# Patient Record
Sex: MALE | Race: WHITE | NOT HISPANIC OR LATINO | URBAN - METROPOLITAN AREA
[De-identification: names, ages, dates, MRNs, and addresses within clinical notes are randomized per-mention and may not be internally consistent; named-entity substitution may affect disease eponyms.]

---

## 2022-09-15 ENCOUNTER — OFFICE VISIT (OUTPATIENT)
Dept: INTERNAL MEDICINE CLINIC | Facility: CLINIC | Age: 32
End: 2022-09-15
Payer: COMMERCIAL

## 2022-09-15 VITALS
OXYGEN SATURATION: 98 % | HEART RATE: 80 BPM | SYSTOLIC BLOOD PRESSURE: 122 MMHG | TEMPERATURE: 98 F | RESPIRATION RATE: 12 BRPM | BODY MASS INDEX: 17.04 KG/M2 | WEIGHT: 119 LBS | DIASTOLIC BLOOD PRESSURE: 70 MMHG | HEIGHT: 70 IN

## 2022-09-15 DIAGNOSIS — S82.001A CLOSED NONDISPLACED FRACTURE OF RIGHT PATELLA, UNSPECIFIED FRACTURE MORPHOLOGY, INITIAL ENCOUNTER: ICD-10-CM

## 2022-09-15 DIAGNOSIS — M25.561 ACUTE PAIN OF RIGHT KNEE: Primary | ICD-10-CM

## 2022-09-15 DIAGNOSIS — F33.41 RECURRENT MAJOR DEPRESSIVE DISORDER, IN PARTIAL REMISSION (HCC): ICD-10-CM

## 2022-09-15 PROCEDURE — 99203 OFFICE O/P NEW LOW 30 MIN: CPT | Performed by: INTERNAL MEDICINE

## 2022-09-15 PROCEDURE — 3725F SCREEN DEPRESSION PERFORMED: CPT | Performed by: INTERNAL MEDICINE

## 2022-09-15 RX ORDER — METHYLPREDNISOLONE 4 MG/1
TABLET ORAL
Qty: 21 EACH | Refills: 0 | Status: SHIPPED | OUTPATIENT
Start: 2022-09-15
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
## 2022-09-29 ENCOUNTER — OFFICE VISIT (OUTPATIENT)
Dept: INTERNAL MEDICINE CLINIC | Facility: CLINIC | Age: 32
End: 2022-09-29
Payer: COMMERCIAL

## 2022-09-29 VITALS
DIASTOLIC BLOOD PRESSURE: 70 MMHG | WEIGHT: 126 LBS | HEART RATE: 74 BPM | TEMPERATURE: 97.8 F | SYSTOLIC BLOOD PRESSURE: 130 MMHG | HEIGHT: 70 IN | BODY MASS INDEX: 18.04 KG/M2

## 2022-09-29 DIAGNOSIS — J06.9 UPPER RESPIRATORY TRACT INFECTION, UNSPECIFIED TYPE: ICD-10-CM

## 2022-09-29 DIAGNOSIS — M25.561 ACUTE PAIN OF RIGHT KNEE: ICD-10-CM

## 2022-09-29 DIAGNOSIS — M25.561 ACUTE PAIN OF RIGHT KNEE: Primary | ICD-10-CM

## 2022-09-29 DIAGNOSIS — M25.561 CHRONIC PAIN OF RIGHT KNEE: ICD-10-CM

## 2022-09-29 DIAGNOSIS — G89.29 CHRONIC PAIN OF RIGHT KNEE: ICD-10-CM

## 2022-09-29 PROCEDURE — 99213 OFFICE O/P EST LOW 20 MIN: CPT | Performed by: INTERNAL MEDICINE
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
## 2022-11-07 ENCOUNTER — HOSPITAL ENCOUNTER (OUTPATIENT)
Dept: MRI IMAGING | Facility: HOSPITAL | Age: 32
Discharge: HOME | End: 2022-11-07

## 2022-11-07 DIAGNOSIS — M25.561 CHRONIC PAIN OF RIGHT KNEE: ICD-10-CM

## 2022-11-07 DIAGNOSIS — G89.29 CHRONIC PAIN OF RIGHT KNEE: ICD-10-CM

## 2022-11-15 ENCOUNTER — TELEPHONE (OUTPATIENT)
Dept: OBGYN CLINIC | Facility: HOSPITAL | Age: 32
End: 2022-11-15

## 2022-11-15 NOTE — TELEPHONE ENCOUNTER
Caller: Baljinder Rao    Doctor: Kate Zapata    Reason for call: Patient called to advise he has appointment w/Dr Kate Zapata 11/16. Stated MRI should be in my chart.  Found that patient has two MRN's [id number] which does have the MRI from 11/7 and [id number]      Call back#: [phone]

## 2022-11-16 ENCOUNTER — OFFICE VISIT (OUTPATIENT)
Dept: OBGYN CLINIC | Facility: CLINIC | Age: 32
End: 2022-11-16

## 2022-11-16 ENCOUNTER — APPOINTMENT (OUTPATIENT)
Dept: RADIOLOGY | Facility: CLINIC | Age: 32
End: 2022-11-16

## 2022-11-16 VITALS
WEIGHT: 119 LBS | DIASTOLIC BLOOD PRESSURE: 72 MMHG | SYSTOLIC BLOOD PRESSURE: 122 MMHG | BODY MASS INDEX: 17.04 KG/M2 | HEIGHT: 70 IN

## 2022-11-16 DIAGNOSIS — S83.242A OTHER TEAR OF MEDIAL MENISCUS OF LEFT KNEE AS CURRENT INJURY, INITIAL ENCOUNTER: Primary | ICD-10-CM

## 2022-11-16 DIAGNOSIS — M25.561 CHRONIC PAIN OF RIGHT KNEE: ICD-10-CM

## 2022-11-16 DIAGNOSIS — M25.561 RIGHT KNEE PAIN, UNSPECIFIED CHRONICITY: ICD-10-CM

## 2022-11-16 DIAGNOSIS — G89.29 CHRONIC PAIN OF RIGHT KNEE: ICD-10-CM

## 2022-11-16 NOTE — PROGRESS NOTES
Assessment:     1  Other tear of medial meniscus of left knee as current injury, initial encounter    2  Chronic pain of right knee        Plan:     Problem List Items Addressed This Visit        Musculoskeletal and Integument    Tear of medial meniscus of left knee, current - Primary    Relevant Orders    Ambulatory Referral to Physical Therapy    Durable Medical Equipment   Other Visit Diagnoses     Chronic pain of right knee        Relevant Orders    XR knee 4+ vw right injury    Ambulatory Referral to Physical Therapy    Durable Medical Equipment        Findings today are consistent with chronic right knee pain secondary to improper rehabilitation from patella fracture and medial meniscus tear  Imaging and prognosis was reviewed with the patient today  Discussed with patient his overall function of the knee is limited, most of his pain is likely from improper rehabilitation from patella fracture and not all from the medial mensicus tear  Referral to physical therapy was placed to work on the overall function of the knee  I explained to patient that surgical intervention for the meniscus tear will decrease his function further  I would like to improve patient's knee function and decrease the amount of pain he has before recommendation of surgery  I also like to review his treatment record from last year of his patella fracture  Patient will work on obtained records from [facility], PCP and physical therapy  Follow up in 4 weeks  All patient's questions were answered to his satisfaction  This note is created using dictation transcription  It may contain typographical errors, grammatical errors, improperly dictated words, background noise and other errors  Subjective:     Patient ID: Jovan Martinez is a 34 y o  male  Chief Complaint:  28-year-old male presents with a chief complaint of right knee pain  Patient is referred here by Dr Valerie Jean    The pain began 1 year(s) ago and is associated with an acute injury  Patient states that he suffered a fracture of his patella 11/4/2022 and has experienced knee pain ever since  The patient describes the pain as aching and throbbing and 8 out of 10 in intensity  It is constant in timing, and localizes the pain to the entire knee  The pain is worse with walking, going up and down stairs and standing and relieved with rest   He admits to mechanical symptoms such as locking and catching  He admits to instability of the knee  According to the patient after he sustained the fracture to his patella be was placed in a knee immobilizer for about 2 months  Patient also stated he start physical therapy while he was still in the knee immobilizer  Patient stated that his knee pain never resolved  He continued to ambulate with a limp  He now wears a short hinge knee brace when active  Information on patient's intake form was reviewed  Allergy:  No Known Allergies  Medications:  all current active meds have been reviewed  Past Medical History:  No past medical history on file  Past Surgical History:  Past Surgical History:   Procedure Laterality Date   • CHOLECYSTECTOMY  2015     Family History:  No family history on file  Social History:  Social History     Substance and Sexual Activity   Alcohol Use Not on file     Social History     Substance and Sexual Activity   Drug Use Not on file     Social History     Tobacco Use   Smoking Status Never   Smokeless Tobacco Never     Review of Systems   Constitutional: Negative for chills and fever  HENT: Negative for ear pain and sore throat  Eyes: Negative for pain and visual disturbance  Respiratory: Negative for cough and shortness of breath  Cardiovascular: Negative for chest pain and palpitations  Gastrointestinal: Negative for abdominal pain and vomiting  Genitourinary: Negative for dysuria and hematuria  Musculoskeletal: Positive for arthralgias (right knee) and gait problem (Antalgic)   Negative for back pain and joint swelling  Skin: Negative for color change and rash  Neurological: Negative for seizures and syncope  Psychiatric/Behavioral: Negative  All other systems reviewed and are negative  Objective:  BP Readings from Last 1 Encounters:   11/16/22 122/72      Wt Readings from Last 1 Encounters:   11/16/22 54 kg (119 lb)      BMI:   Estimated body mass index is 17 07 kg/m² as calculated from the following:    Height as of this encounter: 5' 10" (1 778 m)  Weight as of this encounter: 54 kg (119 lb)  BSA:   Estimated body surface area is 1 67 meters squared as calculated from the following:    Height as of this encounter: 5' 10" (1 778 m)  Weight as of this encounter: 54 kg (119 lb)  Physical Exam  Vitals and nursing note reviewed  Constitutional:       Appearance: Normal appearance  He is well-developed  HENT:      Head: Normocephalic and atraumatic  Right Ear: External ear normal       Left Ear: External ear normal    Eyes:      Extraocular Movements: Extraocular movements intact  Conjunctiva/sclera: Conjunctivae normal    Pulmonary:      Effort: Pulmonary effort is normal    Musculoskeletal:      Cervical back: Neck supple  Right knee: No effusion  Instability Tests: Medial Lorraine test positive  Lateral Lorraine test negative  Skin:     General: Skin is warm and dry  Neurological:      Mental Status: He is alert and oriented to person, place, and time  Psychiatric:         Mood and Affect: Mood normal          Behavior: Behavior normal        Right Knee Exam     Tenderness   The patient is experiencing tenderness in the lateral joint line and medial joint line (Anterior)      Range of Motion   Extension: 0   Flexion: 120 (Pain)     Tests   Lorraine:  Medial - positive Lateral - negative  Varus: negative Valgus: negative  Patellar apprehension: negative    Other   Erythema: absent  Scars: absent  Sensation: normal  Pulse: present  Swelling: none  Effusion: no effusion present            I have personally reviewed pertinent films in PACS and my interpretation is XR right knee: no acute osseous abnormalities  MRI right knee demonstrates tear of the medial meniscus       Scribe Attestation    I,:  Saravanan Palomino am acting as a scribe while in the presence of the attending physician :       I,:  Phoebe Pacheco MD personally performed the services described in this documentation    as scribed in my presence :

## 2024-09-14 ENCOUNTER — HOSPITAL ENCOUNTER (EMERGENCY)
Dept: HOSPITAL 99 - EMR | Age: 34
Discharge: HOME | End: 2024-09-14
Payer: COMMERCIAL

## 2024-09-14 VITALS — SYSTOLIC BLOOD PRESSURE: 99 MMHG | DIASTOLIC BLOOD PRESSURE: 73 MMHG | RESPIRATION RATE: 19 BRPM

## 2024-09-14 VITALS — RESPIRATION RATE: 16 BRPM

## 2024-09-14 VITALS — SYSTOLIC BLOOD PRESSURE: 103 MMHG | DIASTOLIC BLOOD PRESSURE: 52 MMHG | RESPIRATION RATE: 18 BRPM

## 2024-09-14 VITALS — DIASTOLIC BLOOD PRESSURE: 85 MMHG | RESPIRATION RATE: 15 BRPM | SYSTOLIC BLOOD PRESSURE: 111 MMHG

## 2024-09-14 VITALS — RESPIRATION RATE: 16 BRPM | DIASTOLIC BLOOD PRESSURE: 96 MMHG | SYSTOLIC BLOOD PRESSURE: 112 MMHG

## 2024-09-14 VITALS — RESPIRATION RATE: 23 BRPM

## 2024-09-14 VITALS — RESPIRATION RATE: 19 BRPM

## 2024-09-14 VITALS — RESPIRATION RATE: 17 BRPM

## 2024-09-14 VITALS — SYSTOLIC BLOOD PRESSURE: 128 MMHG | DIASTOLIC BLOOD PRESSURE: 81 MMHG | RESPIRATION RATE: 18 BRPM

## 2024-09-14 VITALS — RESPIRATION RATE: 13 BRPM

## 2024-09-14 VITALS — DIASTOLIC BLOOD PRESSURE: 79 MMHG | SYSTOLIC BLOOD PRESSURE: 108 MMHG | RESPIRATION RATE: 16 BRPM

## 2024-09-14 VITALS — DIASTOLIC BLOOD PRESSURE: 94 MMHG | SYSTOLIC BLOOD PRESSURE: 109 MMHG | RESPIRATION RATE: 14 BRPM

## 2024-09-14 VITALS — RESPIRATION RATE: 12 BRPM

## 2024-09-14 VITALS — RESPIRATION RATE: 15 BRPM

## 2024-09-14 DIAGNOSIS — R07.89: Primary | ICD-10-CM

## 2024-09-14 DIAGNOSIS — R68.83: ICD-10-CM

## 2024-09-14 DIAGNOSIS — M25.512: ICD-10-CM

## 2024-09-14 DIAGNOSIS — F17.210: ICD-10-CM

## 2024-09-14 DIAGNOSIS — K29.70: ICD-10-CM

## 2024-09-14 DIAGNOSIS — F41.9: ICD-10-CM

## 2024-09-14 LAB
ALBUMIN SERPL-MCNC: 4.7 G/DL (ref 3.5–5)
ALP SERPL-CCNC: 87 U/L (ref 38–126)
ALT SERPL-CCNC: 15 U/L (ref 0–50)
AST SERPL-CCNC: 27 U/L (ref 17–59)
BUN SERPL-MCNC: 18 MG/DL (ref 9–20)
CALCIUM SERPL-MCNC: 9.5 MG/DL (ref 8.4–10.2)
CHLORIDE SERPL-SCNC: 104 MMOL/L (ref 98–107)
CO2 SERPL-SCNC: 28 MMOL/L (ref 22–30)
D-DIMER: < 0.27 UG/MLFEU (ref 0–0.5)
EGFR: > 60
ERYTHROCYTE [DISTWIDTH] IN BLOOD BY AUTOMATED COUNT: 11.8 % (ref 11.5–14.5)
GLUCOSE SERPL-MCNC: 100 MG/DL (ref 70–99)
HCT VFR BLD AUTO: 42 % (ref 39–52)
HGB BLD-MCNC: 14.9 G/DL (ref 13–18)
MCHC RBC AUTO-ENTMCNC: 35.5 G/DL (ref 33–37)
MCV RBC AUTO: 85.9 FL (ref 80–94)
NRBC BLD AUTO-RTO: 0 %
PLATELET # BLD AUTO: 257 10^3/UL (ref 130–400)
POTASSIUM SERPL-SCNC: 4.6 MMOL/L (ref 3.5–5.1)
PROT SERPL-MCNC: 6.9 G/DL (ref 6.3–8.2)
SODIUM SERPL-SCNC: 141 MMOL/L (ref 135–145)
TROPONIN I SERPL-MCNC: < 0.012 NG/ML

## 2024-09-14 PROCEDURE — 96374 THER/PROPH/DIAG INJ IV PUSH: CPT

## 2024-09-14 PROCEDURE — 99284 EMERGENCY DEPT VISIT MOD MDM: CPT

## 2024-09-14 RX ADMIN — KETOROLAC TROMETHAMINE 15 MG: 15 INJECTION, SOLUTION INTRAMUSCULAR; INTRAVENOUS at 12:01

## 2024-09-14 RX ADMIN — ALUMINUM HYDROXIDE, MAGNESIUM HYDROXIDE, AND SIMETHICONE 50: 200; 200; 20 SUSPENSION ORAL at 13:24

## 2024-09-19 ENCOUNTER — TELEPHONE (OUTPATIENT)
Dept: INTERNAL MEDICINE CLINIC | Facility: CLINIC | Age: 34
End: 2024-09-19

## 2024-09-19 NOTE — TELEPHONE ENCOUNTER
Called pt and told not sure if we accept his insurance and he should call insurance, also have no morning appt for er f/u or physical until 11/6 , he said would have to go somewhere else.

## 2025-03-05 ENCOUNTER — APPOINTMENT (OUTPATIENT)
Dept: RADIOLOGY | Facility: HOSPITAL | Age: 35
End: 2025-03-05
Payer: COMMERCIAL

## 2025-03-05 ENCOUNTER — HOSPITAL ENCOUNTER (EMERGENCY)
Facility: HOSPITAL | Age: 35
Discharge: HOME | End: 2025-03-05
Attending: EMERGENCY MEDICINE
Payer: COMMERCIAL

## 2025-03-05 VITALS
TEMPERATURE: 97.5 F | RESPIRATION RATE: 16 BRPM | DIASTOLIC BLOOD PRESSURE: 85 MMHG | OXYGEN SATURATION: 98 % | HEART RATE: 73 BPM | WEIGHT: 114.64 LBS | HEIGHT: 70 IN | SYSTOLIC BLOOD PRESSURE: 135 MMHG | BODY MASS INDEX: 16.41 KG/M2

## 2025-03-05 DIAGNOSIS — R07.89 CHEST WALL PAIN: Primary | ICD-10-CM

## 2025-03-05 PROCEDURE — 71046 X-RAY EXAM CHEST 2 VIEWS: CPT

## 2025-03-05 PROCEDURE — 99283 EMERGENCY DEPT VISIT LOW MDM: CPT

## 2025-03-05 PROCEDURE — 99284 EMERGENCY DEPT VISIT MOD MDM: CPT | Performed by: EMERGENCY MEDICINE

## 2025-03-05 PROCEDURE — 96372 THER/PROPH/DIAG INJ SC/IM: CPT

## 2025-03-05 RX ORDER — KETOROLAC TROMETHAMINE 30 MG/ML
15 INJECTION, SOLUTION INTRAMUSCULAR; INTRAVENOUS ONCE
Status: COMPLETED | OUTPATIENT
Start: 2025-03-05 | End: 2025-03-05

## 2025-03-05 RX ORDER — METHOCARBAMOL 500 MG/1
500 TABLET, FILM COATED ORAL 2 TIMES DAILY
Qty: 20 TABLET | Refills: 0 | Status: SHIPPED | OUTPATIENT
Start: 2025-03-05 | End: 2025-03-15

## 2025-03-05 RX ORDER — NAPROXEN 375 MG/1
375 TABLET ORAL 2 TIMES DAILY WITH MEALS
Qty: 20 TABLET | Refills: 0 | Status: SHIPPED | OUTPATIENT
Start: 2025-03-05 | End: 2025-03-15

## 2025-03-05 RX ADMIN — KETOROLAC TROMETHAMINE 15 MG: 30 INJECTION, SOLUTION INTRAMUSCULAR; INTRAVENOUS at 17:06

## 2025-03-05 NOTE — ED PROVIDER NOTES
Time reflects when diagnosis was documented in both MDM as applicable and the Disposition within this note       Time User Action Codes Description Comment    3/5/2025  5:39 PM Mariusz Jauregui Add [R07.89] Chest wall pain           ED Disposition       ED Disposition   Discharge    Condition   Stable    Date/Time   Wed Mar 5, 2025  5:39 PM    Comment   Too Tsai discharge to home/self care.                   Assessment & Plan       Medical Decision Making  Left rib pain differential includes musculoskeletal pain versus pneumothorax rib fracture will check x-rays for further evaluation low clinical suspicion for acute coronary syndrome or pulmonary embolism    Amount and/or Complexity of Data Reviewed  Radiology: independent interpretation performed.    Risk  Prescription drug management.             Medications   ketorolac (TORADOL) injection 15 mg (15 mg Intramuscular Given 3/5/25 1706)       ED Risk Strat Scores                        PERC Rule for PE      Flowsheet Row Most Recent Value   PERC Rule for PE    Age >=50 0 Filed at: 03/05/2025 1724   HR >=100 0 Filed at: 03/05/2025 1724   O2 Sat on room air < 95% 0 Filed at: 03/05/2025 1724   History of PE or DVT 0 Filed at: 03/05/2025 1724   Recent trauma or surgery 0 Filed at: 03/05/2025 1724   Hemoptysis 0 Filed at: 03/05/2025 1724   Exogenous estrogen 0 Filed at: 03/05/2025 1724   Unilateral leg swelling 0 Filed at: 03/05/2025 1724   PERC Rule for PE Results 0 Filed at: 03/05/2025 1724            SBIRT 20yo+      Flowsheet Row Most Recent Value   Initial Alcohol Screen: US AUDIT-C     1. How often do you have a drink containing alcohol? 0 Filed at: 03/05/2025 1523   2. How many drinks containing alcohol do you have on a typical day you are drinking?  0 Filed at: 03/05/2025 1523   3a. Male UNDER 65: How often do you have five or more drinks on one occasion? 0 Filed at: 03/05/2025 1523   3b. FEMALE Any Age, or MALE 65+: How often do you have 4 or more drinks on  one occassion? 0 Filed at: 03/05/2025 1523   Audit-C Score 0 Filed at: 03/05/2025 1523   LYLE: How many times in the past year have you...    Used an illegal drug or used a prescription medication for non-medical reasons? Never Filed at: 03/05/2025 1523            Wells' Criteria for PE      Flowsheet Row Most Recent Value   Wells' Criteria for PE    Clinical signs and symptoms of DVT 0 Filed at: 03/05/2025 1724   PE is primary diagnosis or equally likely 0 Filed at: 03/05/2025 1724   HR >100 0 Filed at: 03/05/2025 1724   Immobilization at least 3 days or Surgery in the previous 4 weeks 0 Filed at: 03/05/2025 1724   Previous, objectively diagnosed PE or DVT 0 Filed at: 03/05/2025 1724   Hemoptysis 0 Filed at: 03/05/2025 1724   Malignancy with treatment within 6 months or palliative 0 Filed at: 03/05/2025 1724   Wells' Criteria Total 0 Filed at: 03/05/2025 1724                        History of Present Illness       Chief Complaint   Patient presents with    Rib Pain     Pt reports left rib pain that started last week. Cannot recall if any injury occurred.        History reviewed. No pertinent past medical history.   Past Surgical History:   Procedure Laterality Date    CHOLECYSTECTOMY  2015      History reviewed. No pertinent family history.   Social History     Tobacco Use    Smoking status: Every Day     Types: Cigarettes    Smokeless tobacco: Never   Substance Use Topics    Alcohol use: Yes     Comment: socially    Drug use: Yes     Types: Marijuana      E-Cigarette/Vaping      E-Cigarette/Vaping Substances      I have reviewed and agree with the history as documented.     This is a 34-year-old male presents with sharp left rib pain worsening over the past week does have a recent cough pain is worse with movement and palpation and certain positions denies any known trauma no prior history of DVT or PE      History provided by:  Patient  Medical Problem  Location:  Left rib  Quality:  Sharp pain  Severity:   Moderate  Onset quality:  Gradual  Duration:  1 week  Timing:  Constant  Progression:  Worsening  Chronicity:  New  Context:  Left rib pain over the past week  Worsened by:  Movement cough and changing positions      Review of Systems   All other systems reviewed and are negative.          Objective       ED Triage Vitals   Temperature Pulse Blood Pressure Respirations SpO2 Patient Position - Orthostatic VS   03/05/25 1522 03/05/25 1523 03/05/25 1523 03/05/25 1522 03/05/25 1523 03/05/25 1523   97.5 °F (36.4 °C) 73 135/85 16 98 % Sitting      Temp Source Heart Rate Source BP Location FiO2 (%) Pain Score    03/05/25 1522 03/05/25 1523 03/05/25 1523 -- 03/05/25 1706    Temporal Monitor Left arm  9      Vitals      Date and Time Temp Pulse SpO2 Resp BP Pain Score FACES Pain Rating User   03/05/25 1706 -- -- -- -- -- 9 -- EW   03/05/25 1523 -- 73 98 % -- 135/85 -- -- HR   03/05/25 1522 97.5 °F (36.4 °C) -- -- 16 -- -- -- HR            Physical Exam    Results Reviewed       None            XR chest 2 views   ED Interpretation by Mariusz Jauregui DO (03/05 1636)   No acute infiltrate or pneumothorax      Final Interpretation by Bradley Landon Kocher, MD (03/05 1639)      No acute cardiopulmonary disease.            Workstation performed: [id number]             Procedures    ED Medication and Procedure Management   Prior to Admission Medications   Prescriptions Last Dose Informant Patient Reported? Taking?   methylPREDNISolone 4 MG tablet therapy pack   No No   Sig: Use as directed on package   Patient not taking: Reported on 11/16/2022   oxaprozin (DAYPRO) 600 MG tablet   No No   Sig: Take 2 tablets (1,200 mg total) by mouth daily as needed (pain) for up to 10 days      Facility-Administered Medications: None     Patient's Medications   Discharge Prescriptions    METHOCARBAMOL (ROBAXIN) 500 MG TABLET    Take 1 tablet (500 mg total) by mouth 2 (two) times a day for 10 days       Start Date: 3/5/2025  End Date:  3/15/2025       Order Dose: 500 mg       Quantity: 20 tablet    Refills: 0    NAPROXEN (NAPROSYN) 375 MG TABLET    Take 1 tablet (375 mg total) by mouth 2 (two) times a day with meals for 10 days       Start Date: 3/5/2025  End Date: 3/15/2025       Order Dose: 375 mg       Quantity: 20 tablet    Refills: 0     No discharge procedures on file.  ED SEPSIS DOCUMENTATION   Time reflects when diagnosis was documented in both MDM as applicable and the Disposition within this note       Time User Action Codes Description Comment    3/5/2025  5:39 PM Mariusz Jauregui [R07.89] Chest wall pain                  Mariusz Jauregui DO  03/05/25 1740

## 2025-04-13 ENCOUNTER — HOSPITAL ENCOUNTER (EMERGENCY)
Dept: HOSPITAL 99 - EMR | Age: 35
Discharge: HOME | End: 2025-04-13
Payer: SELF-PAY

## 2025-04-13 VITALS — SYSTOLIC BLOOD PRESSURE: 119 MMHG | DIASTOLIC BLOOD PRESSURE: 86 MMHG

## 2025-04-13 DIAGNOSIS — R45.851: Primary | ICD-10-CM

## 2025-04-13 DIAGNOSIS — F17.200: ICD-10-CM

## 2025-04-13 DIAGNOSIS — F43.21: ICD-10-CM

## 2025-04-13 DIAGNOSIS — F32.A: ICD-10-CM

## 2025-04-13 DIAGNOSIS — F41.9: ICD-10-CM

## 2025-04-13 DIAGNOSIS — K30: ICD-10-CM

## 2025-04-13 DIAGNOSIS — Z65.8: ICD-10-CM

## 2025-04-13 DIAGNOSIS — F10.90: ICD-10-CM

## 2025-04-13 PROCEDURE — 99283 EMERGENCY DEPT VISIT LOW MDM: CPT
